# Patient Record
Sex: MALE | Race: BLACK OR AFRICAN AMERICAN | NOT HISPANIC OR LATINO | Employment: UNEMPLOYED | URBAN - METROPOLITAN AREA
[De-identification: names, ages, dates, MRNs, and addresses within clinical notes are randomized per-mention and may not be internally consistent; named-entity substitution may affect disease eponyms.]

---

## 2017-09-03 ENCOUNTER — HOSPITAL ENCOUNTER (EMERGENCY)
Facility: OTHER | Age: 27
Discharge: PSYCHIATRIC OR SUBSTANCE USE FACILITY | End: 2017-09-05
Attending: EMERGENCY MEDICINE
Payer: MEDICAID

## 2017-09-03 DIAGNOSIS — R45.89 EMOTIONAL UPSET: Primary | ICD-10-CM

## 2017-09-03 LAB
ALBUMIN SERPL BCP-MCNC: 3.7 G/DL
ALP SERPL-CCNC: 69 U/L
ALT SERPL W/O P-5'-P-CCNC: 32 U/L
AMPHET+METHAMPHET UR QL: NEGATIVE
ANION GAP SERPL CALC-SCNC: 10 MMOL/L
ANISOCYTOSIS BLD QL SMEAR: SLIGHT
APAP SERPL-MCNC: <3 UG/ML
AST SERPL-CCNC: 46 U/L
BARBITURATES UR QL SCN>200 NG/ML: NEGATIVE
BASOPHILS # BLD AUTO: 0.02 K/UL
BASOPHILS NFR BLD: 0.2 %
BENZODIAZ UR QL SCN>200 NG/ML: NEGATIVE
BILIRUB SERPL-MCNC: 0.6 MG/DL
BILIRUB UR QL STRIP: NEGATIVE
BUN SERPL-MCNC: 8 MG/DL
BZE UR QL SCN: ABNORMAL
CALCIUM SERPL-MCNC: 9.5 MG/DL
CANNABINOIDS UR QL SCN: ABNORMAL
CHLORIDE SERPL-SCNC: 98 MMOL/L
CK SERPL-CCNC: 1307 U/L
CK SERPL-CCNC: 1907 U/L
CLARITY UR: CLEAR
CO2 SERPL-SCNC: 26 MMOL/L
COLOR UR: YELLOW
CREAT SERPL-MCNC: 1 MG/DL
CREAT UR-MCNC: 408.1 MG/DL
DIFFERENTIAL METHOD: ABNORMAL
EOSINOPHIL # BLD AUTO: 0.1 K/UL
EOSINOPHIL NFR BLD: 1.2 %
ERYTHROCYTE [DISTWIDTH] IN BLOOD BY AUTOMATED COUNT: 14.4 %
EST. GFR  (AFRICAN AMERICAN): >60 ML/MIN/1.73 M^2
EST. GFR  (NON AFRICAN AMERICAN): >60 ML/MIN/1.73 M^2
ETHANOL SERPL-MCNC: <10 MG/DL
GIANT PLATELETS BLD QL SMEAR: PRESENT
GLUCOSE SERPL-MCNC: 131 MG/DL
GLUCOSE UR QL STRIP: NEGATIVE
HCT VFR BLD AUTO: 39.6 %
HGB BLD-MCNC: 12.6 G/DL
HGB UR QL STRIP: NEGATIVE
KETONES UR QL STRIP: NEGATIVE
LEUKOCYTE ESTERASE UR QL STRIP: NEGATIVE
LYMPHOCYTES # BLD AUTO: 0.9 K/UL
LYMPHOCYTES NFR BLD: 9.1 %
MCH RBC QN AUTO: 23 PG
MCHC RBC AUTO-ENTMCNC: 31.8 G/DL
MCV RBC AUTO: 72 FL
METHADONE UR QL SCN>300 NG/ML: NEGATIVE
MONOCYTES # BLD AUTO: 0.8 K/UL
MONOCYTES NFR BLD: 8.5 %
NEUTROPHILS # BLD AUTO: 7.7 K/UL
NEUTROPHILS NFR BLD: 81 %
NITRITE UR QL STRIP: NEGATIVE
OPIATES UR QL SCN: ABNORMAL
OVALOCYTES BLD QL SMEAR: ABNORMAL
PCP UR QL SCN>25 NG/ML: NEGATIVE
PH UR STRIP: 6 [PH] (ref 5–8)
PLATELET # BLD AUTO: 258 K/UL
PLATELET BLD QL SMEAR: ABNORMAL
PMV BLD AUTO: 9.5 FL
POIKILOCYTOSIS BLD QL SMEAR: SLIGHT
POTASSIUM SERPL-SCNC: 3.2 MMOL/L
PROT SERPL-MCNC: 7.5 G/DL
PROT UR QL STRIP: NEGATIVE
RBC # BLD AUTO: 5.47 M/UL
SCHISTOCYTES BLD QL SMEAR: PRESENT
SODIUM SERPL-SCNC: 134 MMOL/L
SP GR UR STRIP: 1.02 (ref 1–1.03)
SPHEROCYTES BLD QL SMEAR: ABNORMAL
T4 FREE SERPL-MCNC: 0.88 NG/DL
TOXICOLOGY INFORMATION: ABNORMAL
TSH SERPL DL<=0.005 MIU/L-ACNC: 0.14 UIU/ML
URN SPEC COLLECT METH UR: ABNORMAL
UROBILINOGEN UR STRIP-ACNC: ABNORMAL EU/DL
WBC # BLD AUTO: 9.53 K/UL

## 2017-09-03 PROCEDURE — 80320 DRUG SCREEN QUANTALCOHOLS: CPT

## 2017-09-03 PROCEDURE — 25000003 PHARM REV CODE 250: Performed by: EMERGENCY MEDICINE

## 2017-09-03 PROCEDURE — 93005 ELECTROCARDIOGRAM TRACING: CPT

## 2017-09-03 PROCEDURE — 93010 ELECTROCARDIOGRAM REPORT: CPT | Mod: ,,, | Performed by: INTERNAL MEDICINE

## 2017-09-03 PROCEDURE — 82550 ASSAY OF CK (CPK): CPT

## 2017-09-03 PROCEDURE — 85025 COMPLETE CBC W/AUTO DIFF WBC: CPT

## 2017-09-03 PROCEDURE — 25000003 PHARM REV CODE 250: Performed by: PSYCHIATRY & NEUROLOGY

## 2017-09-03 PROCEDURE — 84443 ASSAY THYROID STIM HORMONE: CPT

## 2017-09-03 PROCEDURE — 80329 ANALGESICS NON-OPIOID 1 OR 2: CPT

## 2017-09-03 PROCEDURE — 84439 ASSAY OF FREE THYROXINE: CPT

## 2017-09-03 PROCEDURE — 82550 ASSAY OF CK (CPK): CPT | Mod: 91

## 2017-09-03 PROCEDURE — 81003 URINALYSIS AUTO W/O SCOPE: CPT

## 2017-09-03 PROCEDURE — 80053 COMPREHEN METABOLIC PANEL: CPT

## 2017-09-03 PROCEDURE — 96360 HYDRATION IV INFUSION INIT: CPT

## 2017-09-03 PROCEDURE — 80307 DRUG TEST PRSMV CHEM ANLYZR: CPT

## 2017-09-03 PROCEDURE — 99285 EMERGENCY DEPT VISIT HI MDM: CPT | Mod: 25

## 2017-09-03 PROCEDURE — 96361 HYDRATE IV INFUSION ADD-ON: CPT

## 2017-09-03 RX ORDER — ACETAMINOPHEN 325 MG/1
650 TABLET ORAL EVERY 6 HOURS PRN
Status: DISCONTINUED | OUTPATIENT
Start: 2017-09-03 | End: 2017-09-05 | Stop reason: HOSPADM

## 2017-09-03 RX ORDER — POTASSIUM CHLORIDE 20 MEQ/1
60 TABLET, EXTENDED RELEASE ORAL
Status: COMPLETED | OUTPATIENT
Start: 2017-09-03 | End: 2017-09-03

## 2017-09-03 RX ORDER — DIVALPROEX SODIUM 250 MG/1
250 TABLET, DELAYED RELEASE ORAL 3 TIMES DAILY
Status: DISCONTINUED | OUTPATIENT
Start: 2017-09-03 | End: 2017-09-04

## 2017-09-03 RX ORDER — DIAZEPAM 5 MG/1
10 TABLET ORAL
Status: COMPLETED | OUTPATIENT
Start: 2017-09-03 | End: 2017-09-03

## 2017-09-03 RX ORDER — OLANZAPINE 10 MG/1
10 TABLET, ORALLY DISINTEGRATING ORAL EVERY 8 HOURS PRN
Status: DISCONTINUED | OUTPATIENT
Start: 2017-09-03 | End: 2017-09-05 | Stop reason: HOSPADM

## 2017-09-03 RX ORDER — QUETIAPINE FUMARATE 100 MG/1
200 TABLET, FILM COATED ORAL NIGHTLY
Status: DISCONTINUED | OUTPATIENT
Start: 2017-09-03 | End: 2017-09-05 | Stop reason: HOSPADM

## 2017-09-03 RX ORDER — SODIUM CHLORIDE 9 MG/ML
1000 INJECTION, SOLUTION INTRAVENOUS
Status: COMPLETED | OUTPATIENT
Start: 2017-09-03 | End: 2017-09-03

## 2017-09-03 RX ADMIN — ACETAMINOPHEN 650 MG: 325 TABLET ORAL at 07:09

## 2017-09-03 RX ADMIN — POTASSIUM CHLORIDE 60 MEQ: 1500 TABLET, EXTENDED RELEASE ORAL at 07:09

## 2017-09-03 RX ADMIN — SODIUM CHLORIDE 1000 ML: 0.9 INJECTION, SOLUTION INTRAVENOUS at 08:09

## 2017-09-03 RX ADMIN — DIAZEPAM 10 MG: 5 TABLET ORAL at 07:09

## 2017-09-03 RX ADMIN — DIVALPROEX SODIUM 250 MG: 250 TABLET, DELAYED RELEASE ORAL at 12:09

## 2017-09-03 NOTE — ED NOTES
Pt is sitting on edge of stretcher laying against head of bed, sleeping, with blanket for comfort. Pt remains in paper scrubs and free of personal items. Pt is connected to cardiac monitoring, NIBP cuff and pulse ox. Appears to be in no acute distress. Ryan Thompson, at bedside. Will continue to monitor.

## 2017-09-03 NOTE — ED NOTES
Rounding completed on pt, PEC protocol continued. Raphael Thompson remains at bedside for direct observation and pt remains close to nurse's station. Pt sleeping in stretcher but arouses to verbal stimulation. NAD, even, unlabored respirations, pt remains on cardiac monitor, pulse ox and BP cycling. Bed in lowest, locked position, side rails up x 2. NS infusing.

## 2017-09-03 NOTE — ED NOTES
Rounding completed on pt, PEC protocol continued. betsey Thompson remains at bedside for direct observation and pt remains close to nurse's station. Pt sleeping in stretcher, meal tray at bedside. NAD, even, unlabored respirations, pt remains on cardiac monitor, pulse ox and BP cycling. Bed in lowest, locked position, side rails up x 2.

## 2017-09-03 NOTE — ED NOTES
Report received from Samantha JACINTO RN. PT lying in bed with eyes closed,bed locked and low, side rails up Xs 2, respirations even and unlabored, sitter Raphael posted for direct observation. Pt remains connected to cardiac monitor, automatic BP, and continuous pulse ox pending medical clearance. Will continue to monitor.

## 2017-09-03 NOTE — ED NOTES
Dr. Shepherd at bedside. Rounding completed on pt, PEC protocol continued. Soha Thompson remains at bedside for direct observation and pt remains close to nurse's station. NAD, even, unlabored respirations, pt remains on cardiac monitor, pulse ox and BP cycling. Bed in lowest, locked position, side rails up x 2. NS completed and CPK sent to lab.

## 2017-09-03 NOTE — ED NOTES
Assumed care of pt from LEEANNE Gupta. When RN attempting to give patient PO potassium pt began grimacing and c/o bilateral leg cramps. Cramping continued for approx 4 min., MD informed and given PO valium. PEC protocol continued, Raphael nava at bedside for direct observation. Pt remains close to nurse's station, bed in lowest, locked position, side rails up x 2.

## 2017-09-03 NOTE — PROVIDER PROGRESS NOTES - EMERGENCY DEPT.
Encounter Date: 9/3/2017    ED Physician Progress Notes        Physician Note:   I was asked by Dr. Romero to follow-up on the patient's CPK after IV fluids.  Repeat CPK is 1300 which is improved from 1900.  At this point I feel only oral hydration as needed.  I will medically clear the patient for psychiatric evaluation.    6:16 PM I have been observing the patient throughout my shift of the last few hours.  His been stable.  I've been eating and going to the restroom.

## 2017-09-03 NOTE — ED NOTES
Belongings  Plastic rosary  1 pair of socks  1 pair of black shoes  1 pair of jeans  1 multi color shirt  2 plastic bracelets.

## 2017-09-03 NOTE — ED NOTES
Rounding completed on pt, PEC protocol continued. Raphael Thompson remains at bedside for direct observation and pt remains close to nurse's station. NAD, even, unlabored respirations, pt remains on cardiac monitor, pulse ox and BP cycling. Bed in lowest, locked position, side rails up x 2. NS infusing. Will collect CPK after fluids are completed.

## 2017-09-03 NOTE — ED NOTES
PEC packet faxed to: [facility], [facility], [facility], [facility], [facility], [facility], [facility], [facility], [facility], [facility]

## 2017-09-03 NOTE — ED NOTES
Called dietary for PEC meal tray. Pt updated on plan of care. Pt requesting to call mother. Dr. Latif made aware and approved. PEC protocol continued. Soha Thompson remains at bedside for direct observation and pt remains close to nurse's station. Pt sleeping in stretcher. NAD, even, unlabored respirations. Bed in lowest, locked position, side rails up x 2.

## 2017-09-03 NOTE — ED NOTES
Pt care assumed.  Pt rounding complete.  Pain 8/10, c/o back pain.  Restroom and comfort needs addressed.  Pt updated on plan of care.  Will continue to monitor.  Deja Thompson, at bedside.

## 2017-09-03 NOTE — ED NOTES
Rounding completed on pt, PEC protocol continued. betsey Thompson remains at bedside for direct observation and pt remains close to nurse's station. Pt sleeping in stretcher. NAD, even, unlabored respirations, pt remains on cardiac monitor, pulse ox and BP cycling. Bed in lowest, locked position, side rails up x 2.

## 2017-09-03 NOTE — ED NOTES
Faxed packet to [facility], [facility], [facility], [facility], [facility], [facility], [facility], [facility], [facility], [facility]

## 2017-09-03 NOTE — ED NOTES
Pt is sitting on edge of bed. Pt in paper scrubs and free of personal items. Pt appears to be in no acute distress but remains restless. Pt is cooperative with staff and non-combative. Ryan Thompson, at bedside. Bed is locked and in lowest position. Will continue to monitor.

## 2017-09-03 NOTE — ED NOTES
PEC protocol continued. betsey Thompson remains at bedside for direct observation and pt remains close to nurse's station. Pt sleeping in stretcher. NAD, even, unlabored respirations. Bed in lowest, locked position, side rails up x 2.

## 2017-09-03 NOTE — ED NOTES
Dr Shepherd states no beds available at [facility], and to seek placement through [facility].

## 2017-09-03 NOTE — ED NOTES
Faxed packet to [facility], [facility], [facility], [facility], [facility], [facility], [facility], [facility], [facility], [facility], [facility], [facility], [facility]

## 2017-09-03 NOTE — ED TRIAGE NOTES
"Pt presents to ED with c/o suicide attempt PTA by "injecting opiate pills, heroin and coke" after an argument with his wife who has been "being a thot". Pt reports previous hx of suicidal ideation and has hx of depression and bipolar. Pt is not compliant with phychiatric medication.       "

## 2017-09-03 NOTE — ED NOTES
Rounding completed on pt, PEC protocol continued. Raphael Thompson remains at bedside for direct observation and pt remains close to nurse's station. NAD, even, unlabored respirations, pt remains on cardiac monitor, pulse ox and BP cycling. Bed in lowest, locked position, side rails up x 2.

## 2017-09-03 NOTE — ED NOTES
PEC protocol continued. Soha Thompson remains at bedside for direct observation and pt remains close to nurse's station. Pt sleeping in stretcher. NAD, even, unlabored respirations. Bed in lowest, locked position, side rails up x 2.

## 2017-09-04 VITALS
TEMPERATURE: 98 F | HEIGHT: 69 IN | HEART RATE: 68 BPM | BODY MASS INDEX: 28.14 KG/M2 | RESPIRATION RATE: 14 BRPM | WEIGHT: 190 LBS | SYSTOLIC BLOOD PRESSURE: 124 MMHG | DIASTOLIC BLOOD PRESSURE: 68 MMHG | OXYGEN SATURATION: 99 %

## 2017-09-04 LAB — CK SERPL-CCNC: 769 U/L

## 2017-09-04 PROCEDURE — 25000003 PHARM REV CODE 250: Performed by: PSYCHIATRY & NEUROLOGY

## 2017-09-04 PROCEDURE — 25000003 PHARM REV CODE 250: Performed by: EMERGENCY MEDICINE

## 2017-09-04 PROCEDURE — 82550 ASSAY OF CK (CPK): CPT

## 2017-09-04 RX ORDER — BUPRENORPHINE 2 MG/1
4 TABLET SUBLINGUAL 2 TIMES DAILY
Status: DISCONTINUED | OUTPATIENT
Start: 2017-09-04 | End: 2017-09-05 | Stop reason: HOSPADM

## 2017-09-04 RX ORDER — DIVALPROEX SODIUM 250 MG/1
500 TABLET, DELAYED RELEASE ORAL EVERY 8 HOURS
Status: DISCONTINUED | OUTPATIENT
Start: 2017-09-04 | End: 2017-09-05 | Stop reason: HOSPADM

## 2017-09-04 RX ADMIN — QUETIAPINE FUMARATE 200 MG: 100 TABLET, FILM COATED ORAL at 12:09

## 2017-09-04 RX ADMIN — DIVALPROEX SODIUM 250 MG: 250 TABLET, DELAYED RELEASE ORAL at 12:09

## 2017-09-04 RX ADMIN — DIVALPROEX SODIUM 250 MG: 250 TABLET, DELAYED RELEASE ORAL at 09:09

## 2017-09-04 RX ADMIN — DIVALPROEX SODIUM 500 MG: 250 TABLET, DELAYED RELEASE ORAL at 09:09

## 2017-09-04 RX ADMIN — DIVALPROEX SODIUM 500 MG: 250 TABLET, DELAYED RELEASE ORAL at 02:09

## 2017-09-04 RX ADMIN — QUETIAPINE FUMARATE 200 MG: 100 TABLET, FILM COATED ORAL at 09:09

## 2017-09-04 RX ADMIN — OLANZAPINE 10 MG: 10 TABLET, ORALLY DISINTEGRATING ORAL at 10:09

## 2017-09-04 RX ADMIN — BUPRENORPHINE HYDROCHLORIDE SUBLINGUAL 4 MG: 2 TABLET SUBLINGUAL at 09:09

## 2017-09-04 RX ADMIN — BUPRENORPHINE HYDROCHLORIDE SUBLINGUAL 4 MG: 2 TABLET SUBLINGUAL at 12:09

## 2017-09-04 NOTE — ED NOTES
Unable to Contact  [facility]cob [facility]  states she will attempt to contact him, and have him call ED for placement.

## 2017-09-04 NOTE — ED NOTES
"[facility] contacted the following facilities' admissions departments regarding placement:  [facility]- called twice; no answer- "Mailbox is full."  [facility]- no answer; will attempt call back in 30 minutes  [facility]- spoke with Purvi, who stated that they are full.  [facility]- spoke with Purvi, who stated that they are full.  [facility]- No answer; left voicemail  [facility]- pt does not meet age criteria (facility accepts pts. 25 and up).  [facility]- spoke with Colin, who stated that they are full.  [facility]- spoke with Shagufta, who stated that they have no available beds.  [facility]- no answer; left message  [facility] asked for packet to be refaxed; packet was refaxed.  [facility]- spoke with Sudhakar, who stated that they are full.  [facility]- spoke with Pippa, who stated that they will call us back.  [facility]- spoke with Darrel, who stated that their intake staff person is off-site and they have no way to confirm if packet was received, and that they have no contact information for intake staff. Packet was refaxed, and [facility] contact number was given.  [facility]- spoke with House Supervisor Irene, who stated that they are full.  [facility]- spoke with Veronica- stated that they received packet and are reviewing all packets at this time.  [facility]- spoke with Jose Alejandro who stated that they have no beds available at this time  [facility]- spoke with Patito, who stated that they have no beds.  [facility]- spoke with Xavier, who stated that they have no beds available at this time.  [facility]- spoke with Mehnaz, who stated that they have received the packet and are reviewing all packets at this time.  [facility]- pt does not meet age criteria for this facility (they accept pts. Age 30 and up).  [facility]- spoke with Iris, who stated that they have received packet and are reviewing all packets at this " time.  [facility]- spoke with Carlita, who stated that they have received packet and are reviewing all packets at this time.  [facility]- spoke with Denise who requested packet be refaxed; packet was refaxed.  [facility]- spoke with Len, who stated that they have no available beds.  [facility]- spoke with Sasha, who stated that they have received packet and are reviewing all packets at this time.  [facility]- spoke with Iris, who stated that they have received packet and are reviewing all packets at this time.  [facility]- spoke with James, who stated that they have received packet and are reviewing all packets at this time.   [facility]- spoke with Donnie, who stated that they have received packet and are reviewing all packets at this time.  [facility]- admissions stated that they have no available beds.  [facility]- spoke with Irene, who stated that they have received packet and are reviewing all packets at this time.  [facility]- no answer; will call back in 30 minutes.

## 2017-09-04 NOTE — ED NOTES
Assumed care of pt from LEEANNE Lomeli. PEC protocol continued. Linda Thompson at bedside for direct observation. Pt calm and cooperative. Pt sleeping but awakes to verbal stimulation when tech requests to take VS. Bed in lowest, locked position, side rails up x 2. Will continue to monitor for changes.

## 2017-09-04 NOTE — ED NOTES
Spoke with Denise in [facility]  Intake, who stated that pt's CPK was 1307 at 1143, and that pt cannot be eligible for acceptance until CPK is under 200.

## 2017-09-04 NOTE — ED NOTES
Pt rounding complete.  Pt asleep, respirations even and unlabored, in no acute distress.  ED sitRyan chavis, at bedside.  Will continue to monitor.

## 2017-09-04 NOTE — ED NOTES
Dr Shepherd states pt will be accepted to [facility] pending discharge.  will call in 2-3 hours with room assingment.

## 2017-09-04 NOTE — ED NOTES
Pt remains in paper scrubs. Linda Thompson, at bedside maintaining 1:1 direct visual contact and charting q15 minute patient check. Pt sleeping in bed with eyes closed. Respirations even and unlabored. Pt arouses to voice stimulus. Bed in low locked position. Side rails up x 2. Will continue to monitor.

## 2017-09-04 NOTE — ED NOTES
Dr Shepherd states no rooms available at this time, but does anticipate discharges today, and will call if a bed becomes available.

## 2017-09-04 NOTE — ED NOTES
Pt remains in paper scrubs. Tasha Thompson, at bedside maintaining 1:1 direct visual contact and charting q15 minute patient check. Pt sleeping in bed with eyes closed. Respirations even and unlabored. Pt arouses to voice stimulus, but is  lethargic. Bed in low locked position. Side rails up x 2. Will continue to monitor.

## 2017-09-04 NOTE — ED NOTES
"Pt reports increasing agitation at this time. Noncombative. Dr. Latif aware. Awaiting Dr. Holm. No placement for pt yet. RAFAEL working on finding placement for pt. Pt requesting to "sign out." Pt aware of plan of care and PEC status. NAD. Zyprexa PO given. Bed linens changed and pt resting comfortably on stretcher. Pt in soiled paper scrubs. Given new clean paper scrubs to change into. Linda Thompson, at bedside maintaining 1:1 direct visual contact and charting q15 minute patient check. Respirations even and unlabored. Bed in low locked position. Side rails up x 1. Will continue to monitor.   "

## 2017-09-04 NOTE — ED NOTES
PEC protocol continued. Linda Thompson at bedside for direct observation. Pt sleeping in stretcher in NAD with even, unlabored respirations. Bed in lowest, locked position.

## 2017-09-04 NOTE — PROVIDER PROGRESS NOTES - EMERGENCY DEPT.
Encounter Date: 9/3/2017    ED Physician Progress Notes        Physician Note:   I took over care of the patient.  Patient currently stable vital signs reviewed and are normal.  No current issues.

## 2017-09-04 NOTE — ED NOTES
Pt remains in paper scrubs. Tasha Thompson, at bedside maintaining 1:1 direct visual contact and charting q15 minute patient check. Pt sleeping in bed with eyes closed. Respirations even and unlabored. Pt arouses to voice stimulus, but is  lethargic. Bed in low locked position. Side rails up x 1. Will continue to monitor.

## 2017-09-04 NOTE — ED NOTES
PEC protocol continued. Ryan Thompson remains at bedside to record patient activities at least every 15 minutes, while maintaining direct visual contact with patient. Pt remains close to nurse's station. Pt sleeping in stretcher. NAD, even, unlabored respirations. Bed in lowest, locked position, side rails up x 2.

## 2017-09-04 NOTE — ED NOTES
Pt remains in paper scrubs. Dr. Holm at bedside. Linda Thompson at bedside maintaining 1:1 direct visual contact and charting q15 minute patient check. Pt awake and speaking to MD. Respirations even and unlabored. Pt calm and cooperative. Bed in low locked position. Side rails up x 2. Will continue to monitor.

## 2017-09-04 NOTE — ED NOTES
Pt remains in paper scrubs. Tasha Thompson at bedside maintaining 1:1 direct visual contact and charting q15 minute patient check. Pt sleeping in bed with eyes closed. Respirations even and unlabored. Pt arouses to voice stimulus, but is  lethargic. Bed in low locked position. Side rails up x 2. Will continue to monitor.

## 2017-09-04 NOTE — ED NOTES
Pt remains in paper scrubs. Linda Thompson, at bedside maintaining 1:1 direct visual contact and charting q15 minute patient check. Pt sleeping in bed with eyes closed. Respirations even and unlabored. Pt arouses to light tap on shoulder and nurse voice. Bed in low locked position. Side rails up x 1. NAD. Denies SI/HI. Awaiting Dr. Holm arrival to ER to assess pt. Will continue to monitor.

## 2017-09-04 NOTE — ED NOTES
Pt remains in paper scrubs. Chyna Thompson at bedside maintaining 1:1 direct visual contact and charting q15 minute patient check. Pt sleeping in bed with eyes closed. Respirations even and unlabored. Pt arouses to voice stimulus, but is  lethargic. Bed in low locked position. Side rails up x 2. Will continue to monitor.

## 2017-09-04 NOTE — ED NOTES
Rounding completed on pt, PEC protocol continued. Linda Thompson at bedside for direct obs. Ordered pt meal tray per request, urinal remains at bedside. Pt remains calm and cooperative. Bed in lowest, locked position, side rails up x 2.

## 2017-09-05 NOTE — ED NOTES
Upon entering room, pt asleep, respirations even and unlabored, NAD, calm, cooperative.  Pt awakened upon verbal stimulus.  ED sitter Raphael at bedside.  Restroom and comfort needs addressed.  Will continue to monitor.  Pt updated on POC.

## 2017-09-05 NOTE — PSYCH
Chart reviewed, the patient examined and case discussed with the staff.  Staff   has reported that the patient is not able to get any placement.  States he took   his medicines.  He was agitated and required p.r.n. Zyprexa.  The patient  working   on medications and interacting in by and he cannot go back to home and he has   go to his friend's place.    He admitted that he   was shooting heroin couple of times and he feels like going through withdrawals.    His body is hurting.  He is  Complaining  feels hot and colds and other withdrawal from opioid and cocaine.  He admitted that he   was snorting some cocaine and he will not find anything.  The patient's urine   was positive for cocaine, opioid and marijuana.  The patient states that he was   hospitalized at [facility] and other places, he does not remember those   places.  The patient states that he is on probation and has court date.  The   patient has no tremors or sweating at this time.  His mood is frustrated.  affect.  He states that he feels angry and like crap.      His blood pressure is 117/59 with 60 pulse.  He believes that Seroquel helped   the last night, he took his Seroquel.  His CPK is 700+.  His CBC and chemistry   are normal.  His liver enzymes are normal.  The patient states that he is   unmarried and has 1 child.  He was born in [place]   and describes childhood as not good.  He has no thoughts of harm to self or others.  He seems to have   cravings for drugs.     ASSESSMENT AND PLAN:  1.  Initiate  Subutex 4 mg sublingual twice a day.  2.  We will increase Depakote to 500 mg 3 times a day.  3.  We will continue Seroquel 200 mg at nighttime.  4.  We will transfer this patient to any available psych facility.      GEETHA/IN  dd: 09/04/2017 12:14:53 (CDT)  td: 09/04/2017 13:24:56 (CDT)  Doc ID   #[id number]  Job ID #[id number]    CC:

## 2017-09-05 NOTE — PSYCH
"IDENTIFICATION DATA:  This is a 26-year-old single  male who was   brought to ER by family due to drug problem, suicidal thoughts, depression and   was noncompliant with medications.  This consult is requested by Dr. Latif   for psych evaluation.  The patient is on a PEC status.    CHIEF COMPLAINT:  "My mother brought me here to get help."    HISTORY OF PRESENT ILLNESS:  According to ER notes, the patient injected opioid   pills, unknown amount after argument with significant other versus wife.  The   patient was recently released from care home.  The patient states that he is   depressed, sad, helpless, irritable, angry and getting frequent suicidal   thoughts.  The patient states that he hears voices, but did not elaborate.  The   patient is sleepy at this time and requires a lot of effort to answer questions.    The patient states that he snorted some cocaine and he is using it daily. He   was positive for opioids, marijuana and cocaine.  He is sleepy at this time,   answers most of the questions.  His alcohol level was less than 10.  The patient   admitted that he is depressed.  He has a history of self-inflicted behavior and   used to pick lot of skin.    He admitted that   he is off of his medicine for about 2 weeks.  The patient's chart indicates that   he has  history of heroin and he was using it on a daily basis.  He has a   history of cocaine and heroin and has very limited sobriety and clean time.  The   patient admitted that he used drugs yesterday.  The patient has a history of   marijuana and he used to smoke marijuana daily.  His chart indicates that he was   on Seroquel, Abilify, Depakote and Prozac.  He did not take any medicine after   discharge from the hospital.  He tried to kill himself many times in the past by   hanging.     PAST PSYCHIATRIC HISTORY:  The patient has a history of suicidal thoughts and   attempts. The patient stated   that he had been to psych facilities, but " he is not sure about the names of   those medicines.  Chart indicates that he was on Abilify, Prozac, Depakote in   the past.  He had been to hospitals  in the past.  He has a history of suicide   attempts.  He is noncompliant with medications and aftercare plan.  He does not   recall the name of his hospital, psychiatrist and rehabilitation facility.    SOCIAL AND FAMILY HISTORY:  Unobtainable.  According to record, he was born and   raised in [place].  He described his childhood as not good.  He is single   and has no children.  He has worked at a [employer].  His wife was cheating on   him.  His father had some mental illness.  I tried to reach the patient's   significant other and no response was received.    PAST MEDICAL HISTORY:  WBC is 9.5, hemoglobin 12.6, hematocrit 39, MCV 72.    Glucose 131.  Sodium 134 and potassium 3.3.  His bilirubin is 0.6 at   nighttime.  His urine is positive for cocaine, opioids, marijuana.    MENTAL STATUS EXAMINATION:  This is a 26-year-old healthy-looking    American male who is sleepy at this time, made fair eye contact.  He was   oriented to day, date, month and year.  Mood is devoid of emotion with a flat   affect.  He is not interested in conversation.  His speech was fluent and clear   and goal oriented.  No pressured speech, loose association or flight of ideas   noted.  He is oriented to day, date, month and year.    The   patient states that he hears voices, but did not elaborate.  According to   records, he tried to kill himself by injecting more  pain pills.  He has   marks on his veins.  Insight and judgment are impaired.  He is of average   intelligence person.    PSYCHIATRIC DIAGNOSES:  AXIS I:  Bipolar disorder, depressed without psychotic features, opioid abuse   disorder, moderate; cocaine use disorder, moderate without perceptual   disturbance, cannabis use disorder, moderate.  AXIS II:  History of antisocial personality disorder.  AXIS III:  Asthma,  hypothyroidism, anemia.  AXIS IV:  Medical problems, financial difficulties, conflict with significant   other versus wife, noncompliant with aftercare plan, drug problem.  AXIS V:  35.    RECOMMENDATIONS:  We will transfer this patient to any available psych facility.    We will start this patient on Depakote 250 three times a day.  We will   consider the option of Seroquel versus Zoloft after getting more information   from family of the patient when he is more alert.  I tried to reach significant   other at [phone].    PROGNOSIS:  Fair.    ESTIMATED LENGTH OF STAY IN THE HOSPITAL:  Would be 3 to 4 days.    ASSETS:  The patient is verbal, medically stable and has a place to live.    PROBLEM LIST:  Suicidal ideation and drug problem, noncompliant with   medications.      AI/HN  dd: 09/03/2017 12:14:37 (CDT)  td: 09/03/2017 20:03:05 (CDT)  Doc ID   #[id number]  Job ID #[id number]    CC:

## 2017-09-05 NOTE — ED NOTES
Pt remains in paper scrubs. Mica Thompson bedside maintaining 1:1 direct visual contact and charting q15 minute patient check. Pt awake and watching tv. Respirations even and unlabored. Bed in low locked position. Side rails up x 1. Will continue to monitor.

## 2017-09-05 NOTE — ED NOTES
Pt remains in paper scrubs. Raphael Thompson at bedside maintaining 1:1 direct visual contact and charting q15 minute patient check. Pt sleeping in bed with eyes closed. Respirations even and unlabored. Pt arouses to voice stimulus, but is  lethargic. Bed in low locked position. Side rails up x 2. Will continue to monitor.

## 2017-09-05 NOTE — ED NOTES
Pt rounding complete.  Pt resting on stretcher with eyes closed, respirations even and unlabored, in no acute distress.  ED sitter Raphael at bedside.  Will continue to monitor.

## 2017-09-05 NOTE — ED NOTES
Called Jaycob at [facility] concerning bed placement for pt.  Jaycob states he will assess the situation and call back.

## 2017-09-05 NOTE — ED NOTES
Pt remains in paper scrubs. Raphael Thompson at bedside maintaining 1:1 direct visual contact and charting q15 minute patient check. Pt sitting in bed watching tv, fully awake alert and oriented. Respirations even and unlabored. Pt ate dinner. Bed in low locked position. Side rails up x 1. Will continue to monitor.

## 2017-09-05 NOTE — ED NOTES
Spoke to Jaycob at [facility]. States will accepted to the 5th floor. Can call report in 10 minutes: [phone] ext [phone].

## 2017-09-05 NOTE — ED NOTES
Report received by Neida. Pt visualized, sleeping upon entry, aroused with voice stimuli. Urinal at bedside, comfort needs addressed. Pt calm and cooperative. Pt updated on POC. Donna Lim at bedside for 1:1 direct visual contact.

## 2017-09-25 ENCOUNTER — HOSPITAL ENCOUNTER (EMERGENCY)
Facility: OTHER | Age: 27
Discharge: PSYCHIATRIC OR SUBSTANCE USE FACILITY | End: 2017-09-25
Attending: EMERGENCY MEDICINE
Payer: MEDICAID

## 2017-09-25 VITALS
HEART RATE: 61 BPM | BODY MASS INDEX: 25.2 KG/M2 | SYSTOLIC BLOOD PRESSURE: 135 MMHG | HEIGHT: 71 IN | WEIGHT: 180 LBS | RESPIRATION RATE: 18 BRPM | OXYGEN SATURATION: 98 % | DIASTOLIC BLOOD PRESSURE: 74 MMHG | TEMPERATURE: 98 F

## 2017-09-25 DIAGNOSIS — R79.89 LOW TSH LEVEL: ICD-10-CM

## 2017-09-25 DIAGNOSIS — D64.9 ANEMIA, UNSPECIFIED TYPE: ICD-10-CM

## 2017-09-25 DIAGNOSIS — R45.851 SUICIDAL IDEATIONS: Primary | ICD-10-CM

## 2017-09-25 LAB
ALBUMIN SERPL BCP-MCNC: 2.9 G/DL
ALP SERPL-CCNC: 62 U/L
ALT SERPL W/O P-5'-P-CCNC: 28 U/L
AMPHET+METHAMPHET UR QL: NEGATIVE
ANION GAP SERPL CALC-SCNC: 8 MMOL/L
ANISOCYTOSIS BLD QL SMEAR: SLIGHT
APAP SERPL-MCNC: <3 UG/ML
AST SERPL-CCNC: 29 U/L
BARBITURATES UR QL SCN>200 NG/ML: NEGATIVE
BASOPHILS # BLD AUTO: 0.02 K/UL
BASOPHILS NFR BLD: 0.3 %
BENZODIAZ UR QL SCN>200 NG/ML: NEGATIVE
BILIRUB SERPL-MCNC: 0.2 MG/DL
BILIRUB UR QL STRIP: NEGATIVE
BUN SERPL-MCNC: 14 MG/DL
BZE UR QL SCN: NORMAL
CALCIUM SERPL-MCNC: 9 MG/DL
CANNABINOIDS UR QL SCN: NORMAL
CHLORIDE SERPL-SCNC: 106 MMOL/L
CLARITY UR: CLEAR
CO2 SERPL-SCNC: 26 MMOL/L
COLOR UR: YELLOW
CREAT SERPL-MCNC: 1 MG/DL
CREAT UR-MCNC: 218.4 MG/DL
DACRYOCYTES BLD QL SMEAR: ABNORMAL
DIFFERENTIAL METHOD: ABNORMAL
EOSINOPHIL # BLD AUTO: 0.3 K/UL
EOSINOPHIL NFR BLD: 4.8 %
ERYTHROCYTE [DISTWIDTH] IN BLOOD BY AUTOMATED COUNT: 15.1 %
EST. GFR  (AFRICAN AMERICAN): >60 ML/MIN/1.73 M^2
EST. GFR  (NON AFRICAN AMERICAN): >60 ML/MIN/1.73 M^2
ETHANOL SERPL-MCNC: <10 MG/DL
GIANT PLATELETS BLD QL SMEAR: PRESENT
GLUCOSE SERPL-MCNC: 82 MG/DL
GLUCOSE UR QL STRIP: NEGATIVE
HCT VFR BLD AUTO: 36 %
HGB BLD-MCNC: 11.1 G/DL
HGB UR QL STRIP: NEGATIVE
HYPOCHROMIA BLD QL SMEAR: ABNORMAL
KETONES UR QL STRIP: NEGATIVE
LEUKOCYTE ESTERASE UR QL STRIP: NEGATIVE
LYMPHOCYTES # BLD AUTO: 1.6 K/UL
LYMPHOCYTES NFR BLD: 23.1 %
MCH RBC QN AUTO: 22.4 PG
MCHC RBC AUTO-ENTMCNC: 30.8 G/DL
MCV RBC AUTO: 73 FL
METHADONE UR QL SCN>300 NG/ML: NEGATIVE
MONOCYTES # BLD AUTO: 0.6 K/UL
MONOCYTES NFR BLD: 7.8 %
NEUTROPHILS # BLD AUTO: 4.5 K/UL
NEUTROPHILS NFR BLD: 64 %
NITRITE UR QL STRIP: NEGATIVE
OPIATES UR QL SCN: NORMAL
OVALOCYTES BLD QL SMEAR: ABNORMAL
PCP UR QL SCN>25 NG/ML: NEGATIVE
PH UR STRIP: 7 [PH] (ref 5–8)
PLATELET # BLD AUTO: 269 K/UL
PLATELET BLD QL SMEAR: ABNORMAL
PMV BLD AUTO: 9.6 FL
POIKILOCYTOSIS BLD QL SMEAR: SLIGHT
POTASSIUM SERPL-SCNC: 3.8 MMOL/L
PROT SERPL-MCNC: 7.1 G/DL
PROT UR QL STRIP: NEGATIVE
RBC # BLD AUTO: 4.96 M/UL
SCHISTOCYTES BLD QL SMEAR: ABNORMAL
SODIUM SERPL-SCNC: 140 MMOL/L
SP GR UR STRIP: 1.02 (ref 1–1.03)
T4 FREE SERPL-MCNC: 0.72 NG/DL
TOXICOLOGY INFORMATION: NORMAL
TSH SERPL DL<=0.005 MIU/L-ACNC: 0.15 UIU/ML
URN SPEC COLLECT METH UR: NORMAL
UROBILINOGEN UR STRIP-ACNC: 1 EU/DL
WBC # BLD AUTO: 7.09 K/UL

## 2017-09-25 PROCEDURE — 80307 DRUG TEST PRSMV CHEM ANLYZR: CPT

## 2017-09-25 PROCEDURE — 80053 COMPREHEN METABOLIC PANEL: CPT

## 2017-09-25 PROCEDURE — 99285 EMERGENCY DEPT VISIT HI MDM: CPT | Mod: 25

## 2017-09-25 PROCEDURE — 84443 ASSAY THYROID STIM HORMONE: CPT

## 2017-09-25 PROCEDURE — 93010 ELECTROCARDIOGRAM REPORT: CPT | Mod: ,,, | Performed by: INTERNAL MEDICINE

## 2017-09-25 PROCEDURE — 84439 ASSAY OF FREE THYROXINE: CPT

## 2017-09-25 PROCEDURE — 80329 ANALGESICS NON-OPIOID 1 OR 2: CPT

## 2017-09-25 PROCEDURE — 80320 DRUG SCREEN QUANTALCOHOLS: CPT

## 2017-09-25 PROCEDURE — 85025 COMPLETE CBC W/AUTO DIFF WBC: CPT

## 2017-09-25 PROCEDURE — 81003 URINALYSIS AUTO W/O SCOPE: CPT

## 2017-09-25 PROCEDURE — 93005 ELECTROCARDIOGRAM TRACING: CPT

## 2017-09-25 RX ORDER — CLONIDINE HYDROCHLORIDE 0.1 MG/1
0.1 TABLET ORAL 3 TIMES DAILY
COMMUNITY

## 2017-09-25 RX ORDER — QUETIAPINE FUMARATE 300 MG/1
300 TABLET, FILM COATED ORAL NIGHTLY
COMMUNITY

## 2017-09-25 RX ORDER — GABAPENTIN 600 MG/1
600 TABLET ORAL 3 TIMES DAILY
COMMUNITY

## 2017-09-25 RX ORDER — QUETIAPINE FUMARATE 50 MG/1
50 TABLET, FILM COATED ORAL DAILY
COMMUNITY

## 2017-09-25 RX ORDER — BUPRENORPHINE HYDROCHLORIDE AND NALOXONE HYDROCHLORIDE DIHYDRATE 2; .5 MG/1; MG/1
1 TABLET SUBLINGUAL 2 TIMES DAILY PRN
COMMUNITY

## 2017-09-25 RX ORDER — DIVALPROEX SODIUM 250 MG/1
250 TABLET, DELAYED RELEASE ORAL EVERY MORNING
COMMUNITY

## 2017-09-25 RX ORDER — CITALOPRAM 40 MG/1
40 TABLET, FILM COATED ORAL DAILY
COMMUNITY

## 2017-09-25 NOTE — ED NOTES
Report received from LEEANNE Hoyos. Pt resting comfortably on stretcher, respirations even and unlabored, pt in no acute distress. Instructed to called report in 15 minutes. ED sitpartha Hutton at bedside. Will continue to monitor.
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
## 2017-09-25 NOTE — ED NOTES
Pt says he was recently released from [facility] and they changed his medications, now he is having auditory hallucinations and SI.     LOC: The patient is awake, alert and aware of environment with an appropriate affect, the patient is oriented x 3 and speaking appropriately.  APPEARANCE: Patient resting comfortably and in no acute distress, patient is clean and well groomed, patient's clothing is properly fastened.  SKIN: The skin is warm and dry, patient has normal skin turgor and moist mucus membranes, skin intact, no breakdown or brusing noted.  MUSKULOSKELETAL: Patient moving all extremities well, no obvious swelling or deformities noted.  RESPIRATORY: Airway is open and patent, respirations are spontaneous, patient has a normal effort and rate. Breath sounds are clear and equal bilaterally.  CARDIAC: Normal heart sounds. No peripheral edema.  ABDOMEN: Soft and non tender to palpation, no distention noted.

## 2017-09-25 NOTE — ED NOTES
Pt resting in bed, NAD, respirations even and unlabored. Elnathure at bedside doing Q15 min assesments. Will continue to monitor.

## 2017-09-26 NOTE — ED NOTES
[facility] called for patient transport to [facility].  Stated will be there within the hour.

## 2017-09-26 NOTE — ED NOTES
Patient resting comfortably in stretcher.  No signs of distress noted.  Patient updated on POC and [facility].  PEC precautions maintained.  ED Anni nava, at the bedside for direct observation.  Will continue to monitor.

## 2018-01-08 ENCOUNTER — HOSPITAL ENCOUNTER (EMERGENCY)
Facility: OTHER | Age: 28
Discharge: PSYCHIATRIC OR SUBSTANCE USE FACILITY | End: 2018-01-08
Attending: EMERGENCY MEDICINE
Payer: MEDICAID

## 2018-01-08 VITALS
DIASTOLIC BLOOD PRESSURE: 98 MMHG | SYSTOLIC BLOOD PRESSURE: 140 MMHG | TEMPERATURE: 99 F | OXYGEN SATURATION: 97 % | HEART RATE: 67 BPM | RESPIRATION RATE: 16 BRPM

## 2018-01-08 DIAGNOSIS — F14.10 COCAINE ABUSE: ICD-10-CM

## 2018-01-08 DIAGNOSIS — F11.10 HEROIN ABUSE: Primary | ICD-10-CM

## 2018-01-08 DIAGNOSIS — R45.89 EMOTIONAL UPSET: ICD-10-CM

## 2018-01-08 LAB
ALBUMIN SERPL BCP-MCNC: 3.2 G/DL
ALP SERPL-CCNC: 80 U/L
ALT SERPL W/O P-5'-P-CCNC: 277 U/L
AMPHET+METHAMPHET UR QL: NEGATIVE
ANION GAP SERPL CALC-SCNC: 6 MMOL/L
APAP SERPL-MCNC: <3 UG/ML
AST SERPL-CCNC: 130 U/L
BARBITURATES UR QL SCN>200 NG/ML: NEGATIVE
BASOPHILS # BLD AUTO: 0.02 K/UL
BASOPHILS NFR BLD: 0.4 %
BENZODIAZ UR QL SCN>200 NG/ML: NORMAL
BILIRUB SERPL-MCNC: 0.3 MG/DL
BILIRUB UR QL STRIP: ABNORMAL
BUN SERPL-MCNC: 10 MG/DL
BZE UR QL SCN: NORMAL
CALCIUM SERPL-MCNC: 9.3 MG/DL
CANNABINOIDS UR QL SCN: NORMAL
CHLORIDE SERPL-SCNC: 104 MMOL/L
CLARITY UR: CLEAR
CO2 SERPL-SCNC: 28 MMOL/L
COLOR UR: YELLOW
CREAT SERPL-MCNC: 1.1 MG/DL
CREAT UR-MCNC: 364.6 MG/DL
DIFFERENTIAL METHOD: ABNORMAL
EOSINOPHIL # BLD AUTO: 0.2 K/UL
EOSINOPHIL NFR BLD: 4.1 %
ERYTHROCYTE [DISTWIDTH] IN BLOOD BY AUTOMATED COUNT: 17.1 %
EST. GFR  (AFRICAN AMERICAN): >60 ML/MIN/1.73 M^2
EST. GFR  (NON AFRICAN AMERICAN): >60 ML/MIN/1.73 M^2
ETHANOL SERPL-MCNC: <10 MG/DL
GLUCOSE SERPL-MCNC: 87 MG/DL
GLUCOSE UR QL STRIP: NEGATIVE
HCT VFR BLD AUTO: 36.7 %
HGB BLD-MCNC: 11.3 G/DL
HGB UR QL STRIP: NEGATIVE
KETONES UR QL STRIP: NEGATIVE
LEUKOCYTE ESTERASE UR QL STRIP: NEGATIVE
LYMPHOCYTES # BLD AUTO: 2.7 K/UL
LYMPHOCYTES NFR BLD: 48.2 %
MCH RBC QN AUTO: 21.5 PG
MCHC RBC AUTO-ENTMCNC: 30.8 G/DL
MCV RBC AUTO: 70 FL
METHADONE UR QL SCN>300 NG/ML: NEGATIVE
MONOCYTES # BLD AUTO: 0.5 K/UL
MONOCYTES NFR BLD: 8.8 %
NEUTROPHILS # BLD AUTO: 2.2 K/UL
NEUTROPHILS NFR BLD: 38.3 %
NITRITE UR QL STRIP: NEGATIVE
OPIATES UR QL SCN: NORMAL
PCP UR QL SCN>25 NG/ML: NEGATIVE
PH UR STRIP: 6 [PH] (ref 5–8)
PLATELET # BLD AUTO: 254 K/UL
PMV BLD AUTO: 9.2 FL
POTASSIUM SERPL-SCNC: 4.1 MMOL/L
PROT SERPL-MCNC: 7.1 G/DL
PROT UR QL STRIP: ABNORMAL
RBC # BLD AUTO: 5.26 M/UL
SODIUM SERPL-SCNC: 138 MMOL/L
SP GR UR STRIP: >=1.03 (ref 1–1.03)
T4 FREE SERPL-MCNC: 1.02 NG/DL
TOXICOLOGY INFORMATION: NORMAL
TSH SERPL DL<=0.005 MIU/L-ACNC: 0.18 UIU/ML
URN SPEC COLLECT METH UR: ABNORMAL
UROBILINOGEN UR STRIP-ACNC: 1 EU/DL
WBC # BLD AUTO: 5.6 K/UL

## 2018-01-08 PROCEDURE — 85025 COMPLETE CBC W/AUTO DIFF WBC: CPT

## 2018-01-08 PROCEDURE — 81003 URINALYSIS AUTO W/O SCOPE: CPT

## 2018-01-08 PROCEDURE — 84443 ASSAY THYROID STIM HORMONE: CPT

## 2018-01-08 PROCEDURE — 93010 ELECTROCARDIOGRAM REPORT: CPT | Mod: ,,, | Performed by: INTERNAL MEDICINE

## 2018-01-08 PROCEDURE — 80320 DRUG SCREEN QUANTALCOHOLS: CPT

## 2018-01-08 PROCEDURE — 80329 ANALGESICS NON-OPIOID 1 OR 2: CPT

## 2018-01-08 PROCEDURE — 80307 DRUG TEST PRSMV CHEM ANLYZR: CPT

## 2018-01-08 PROCEDURE — 80053 COMPREHEN METABOLIC PANEL: CPT

## 2018-01-08 PROCEDURE — 25000003 PHARM REV CODE 250: Performed by: PSYCHIATRY & NEUROLOGY

## 2018-01-08 PROCEDURE — 84439 ASSAY OF FREE THYROXINE: CPT

## 2018-01-08 PROCEDURE — 99285 EMERGENCY DEPT VISIT HI MDM: CPT

## 2018-01-08 RX ORDER — DIVALPROEX SODIUM 250 MG/1
250 TABLET, DELAYED RELEASE ORAL 3 TIMES DAILY
Status: DISCONTINUED | OUTPATIENT
Start: 2018-01-08 | End: 2018-01-08 | Stop reason: HOSPADM

## 2018-01-08 RX ORDER — QUETIAPINE FUMARATE 100 MG/1
100 TABLET, FILM COATED ORAL NIGHTLY
Status: DISCONTINUED | OUTPATIENT
Start: 2018-01-08 | End: 2018-01-08 | Stop reason: HOSPADM

## 2018-01-08 RX ORDER — SERTRALINE HYDROCHLORIDE 25 MG/1
25 TABLET, FILM COATED ORAL DAILY
Status: DISCONTINUED | OUTPATIENT
Start: 2018-01-08 | End: 2018-01-08 | Stop reason: HOSPADM

## 2018-01-08 RX ADMIN — SERTRALINE HYDROCHLORIDE 25 MG: 25 TABLET ORAL at 11:01

## 2018-01-08 NOTE — ED NOTES
PEC protocol continued. Sirisha Thompson remains at bedside for direct obs. Pt appears less lethargic, AAO x 4. Even unlabored respirations. Bed in lowest, locked position, side rails up x 2. Restroom needs addressed.

## 2018-01-08 NOTE — ED TRIAGE NOTES
"Pt states " I've been going through some stuff. I did heroine and cocaine. I've done this before and never felt like this. My insides are burning and my chest feels like its closing. I need some fluids and oxygen" pt does not appear to be in respiratory distress. Sat 100% RA. Pt answering questions appropriately. Pt denies purposefully taking too much. ,  "

## 2018-01-08 NOTE — ED NOTES
"Assumed care of pt from LEEANNE Polanco. PEC protocol continued. Sirisha Thompson at bedside for direct obs. Removed harmful objects from room. Pt remains lethargic but will arouse to verbal stimulation. Pt admits to "doing drugs". Pt calm and cooperative. VSS. Bed in lowest, locked position, side rails up x 2.   "

## 2018-01-08 NOTE — ED NOTES
Pt lying down,respirations even/unlabored. NAD noted. Pt denies any current needs. ED tech dandre at bedside for observation. Will continue to monitor.

## 2018-01-08 NOTE — ED NOTES
PEC protocol continued. Sirisha Thompson at bedside for direct obs. Pt sleeping in stretcher in NAD. Bed in lowest, locked position, side rails up x 2. PEC and packet faxed to [facility]

## 2018-01-08 NOTE — ED NOTES
Faxed PEC and packet to Xiomara with [facility] for possible psych placement. [phone]

## 2018-01-08 NOTE — ED NOTES
PEC protocol continued. Darrius Thompson at bedside for direct obs. Pt sleeping in stretcher in NAD. Bed in lowest, locked position, side rails up x 2.

## 2018-01-08 NOTE — ED NOTES
PEC protocol continued. Sirisha Thompson remains at bedside for direct obs. Pt sleeping in stretcher in NAD with even, unlabored respirations. Bed in lowest, locked position, side rails up x 2. Restroom needs addressed.

## 2018-01-08 NOTE — PROVIDER PROGRESS NOTES - EMERGENCY DEPT.
Encounter Date: 1/8/2018    ED Physician Progress Notes        Physician Note:   Signed out from Dr. Romero pending labs, patient with h/o bipolar d/o and drug abuse, initially presented after drug use with SI. Observed overnight, on re-assessment still with SI, so PEC placed by Dr. Romero.   Labs reviewed with no acute findings, patient is medically cleared for psychiatric evaluation and disposition.

## 2018-01-08 NOTE — ED NOTES
"Upon d/c pt states " I need help. I need to see my psychiatrist. I want to do it this way": MD notified   "
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
## 2018-01-08 NOTE — ED NOTES
PEC protocol continued. Sirisha Thompson at bedside for direct obs. Pt sleeping in stretcher in NAD. Bed in lowest, locked position, side rails up x 2. Pt to be transferred to [facility] care

## 2018-01-08 NOTE — ED NOTES
"Pt dressed into paper scrubs. Personal items put in bags and labeled. Pt items"  1 brown sweat pants  1 brown sweat shirt  1 pair brown boots  1 pair black socks  1 blue/plaid jacket  2 hats  1 harmonica   1 black cell phone  1 blue personal bag   "

## 2018-01-08 NOTE — ED NOTES
PEC protocol continued. Sirisha Thompson at bedside for direct obs. Pt sleeping in stretcher in NAD. Bed in lowest, locked position, side rails up x 2. Will call [facility] for report. [phone].

## 2018-01-09 NOTE — PSYCH
"IDENTIFICATION DATA:  This is a 27-year-old single -American male who was   brought to ER due to depression, suicidal ideation, and drug problem.  This   consult is requested by Dr. Mantilla for psych evaluation.  The patient is on a PEC   status.    CHIEF COMPLAINT:  "I relapsed on drugs, I need help."    HISTORY OF PRESENT ILLNESS:  This patient is known to me from previous   admissions due to depression, suicidal ideation and drug problem.  The patient   was diagnosed with bipolar and stopped taking his psych medications.  He is off   the medicine for a while.  The patient states that he relapsed on heroin,   cocaine, marijuana and Xanbar recently.  The patient states that his friend   dropped him here.  The patient states that he was doing bad stuff including   heroin.  He was injecting half gram of heroin everyday.  He has not been free of   drugs more than few months.  He gets withdrawals from opioids.  He also used   half a gram of cocaine and heroin.  He has been using drugs since his teens.  He   took 1 Xanbar yesterday.  He is also smoking marijuana, but not every day like   he was.  He smoked 1 to 2 joints.  The patient denies hearing voices or seeing   things.  The patient said that he is depressed, sad and anhedonic.  He is tired   of living and using drugs and wanted to end his life.  He is getting suicidal   thoughts.  The patient is positive for benzos, opioids, marijuana and cocaine.    The patient has no job.  He lives with his aunt.  He denies legal issues lately.    The patient has a history of self-inflicted behavior and was picking on skin   partly due to tactile hallucinations.  The patient was feeling hot yesterday and   felt like someone was injecting something.    PAST PSYCHIATRIC HISTORY:  The patient has been hospitalized at least 3 times in   the past, last time was at [facility] on 09/06/2017.  He was   detoxed and started on Depakote and Zoloft.  He was treated with " Abilify and   Seroquel in the past.  He had thoughts of committing suicide, but at one time   strangers stopped him when he was thinking about jumping off the bridge.  He has   been in detox, but he does not remember those places, but now he wanted to go   there.  He does not drink alcohol.   He had been in [facility] before.  He is not   going to any psychiatrist.    SOCIAL AND FAMILY HISTORY:  The patient was born and raised in [place].  He   described his childhood as not good.  He is single and has no children.  He has   no history of sexual abuse.  His mom and dad were alcoholic.  He lives with his   aunt.  He denies family history of mental illness, suicide or drug problem.    MEDICAL HISTORY:  Unremarkable.    ALLERGIES:  The patient is allergic to penicillin.    MEDICATIONS:  He was on Abilify, Depakote, and Seroquel in the past.  His   bilirubin 0.3, , .  His TSH is 1.7.  He was diagnosed with   hypothyroidism in the past.  His WBC is 5.6, hemoglobin 13.3, hematocrit 36.    MENTAL STATUS EXAMINATION:  This is a 27-year-old lean, tall -American   male who looks about his stated age.  He is slightly drowsy and oriented to day,   date, month and year.  Mood is depressed with sad affect.  Psychomotor activity   is decreased.  His speech is soft, clear, normal in amount, rate and tone.  No   loose association or racing thoughts noted.  He has no nausea, vomiting,   tremors, or sweating.  He is lacking some attention and concentration.  He was   intoxicated upon arrival.  He is able to recall 3 objects out of 3 immediately,   3 out of 3 after 5 minutes and events of the past.  He has no intention to harm   to self or others.  He denies hearing voices or seeing things today.  He still   has suicidal thoughts and wanted to go to the hospital.  Insight and judgment   are impaired.  He is of average intelligence person.  He has poor impulse   control.    PSYCHIATRIC DIAGNOSES:  AXIS I:  Bipolar  disorder, depressed without psychotic features, opioid use   disorder, cocaine use disorder, cannabis use disorder, benzo use disorder.  AXIS II:  History of antisocial personality disorder.  AXIS III:  No diagnosis.  AXIS IV:  Financial difficulties, housing problem, relapse on drugs,   noncompliant with psych medications.  AXIS V:  35.    RECOMMENDATIONS:  We will transfer this patient to [facility] for   stabilization.  We will initiate Depakote 250 mg 3 times a day, Zoloft 25 mg   p.o. q.a.m. and Seroquel 50 in the morning and 100 mg at nighttime.       Transfer the patient to [facility].  We will educate about medication   and illness.    ASSETS:  The patient is verbal, cognitively intact and has supportive aunt.    PROBLEM LIST FOR TREATMENT PLAN:  Depression, noncompliant with medicine, drug   problem.      HÉCTOR  dd: 01/08/2018 11:00:58 (CST)  td: 01/08/2018 20:07:32 (CST)  Doc ID   #[id number]  Job ID #[id number]    CC:

## 2018-01-14 ENCOUNTER — HOSPITAL ENCOUNTER (EMERGENCY)
Facility: OTHER | Age: 28
Discharge: HOME | End: 2018-01-15
Attending: EMERGENCY MEDICINE
Payer: MEDICAID

## 2018-01-14 DIAGNOSIS — F11.20 OPIOID USE DISORDER, SEVERE, DEPENDENCE: Chronic | ICD-10-CM

## 2018-01-14 DIAGNOSIS — F19.94 SUBSTANCE INDUCED MOOD DISORDER: ICD-10-CM

## 2018-01-14 DIAGNOSIS — F31.9 BIPOLAR AFFECTIVE DISORDER, REMISSION STATUS UNSPECIFIED: Primary | ICD-10-CM

## 2018-01-14 LAB
ALBUMIN SERPL BCP-MCNC: 4.1 G/DL
ALP SERPL-CCNC: 90 U/L
ALT SERPL W/O P-5'-P-CCNC: 270 U/L
AMPHET+METHAMPHET UR QL: NORMAL
ANION GAP SERPL CALC-SCNC: 8 MMOL/L
APAP SERPL-MCNC: <3 UG/ML
AST SERPL-CCNC: 129 U/L
BARBITURATES UR QL SCN>200 NG/ML: NEGATIVE
BASOPHILS # BLD AUTO: 0.02 K/UL
BASOPHILS NFR BLD: 0.2 %
BENZODIAZ UR QL SCN>200 NG/ML: NEGATIVE
BILIRUB SERPL-MCNC: 0.3 MG/DL
BILIRUB UR QL STRIP: NEGATIVE
BUN SERPL-MCNC: 8 MG/DL
BZE UR QL SCN: NORMAL
CALCIUM SERPL-MCNC: 10 MG/DL
CANNABINOIDS UR QL SCN: NORMAL
CHLORIDE SERPL-SCNC: 101 MMOL/L
CLARITY UR: CLEAR
CO2 SERPL-SCNC: 30 MMOL/L
COLOR UR: YELLOW
CREAT SERPL-MCNC: 1 MG/DL
CREAT UR-MCNC: 71.6 MG/DL
DIFFERENTIAL METHOD: ABNORMAL
EOSINOPHIL # BLD AUTO: 0.1 K/UL
EOSINOPHIL NFR BLD: 1.5 %
ERYTHROCYTE [DISTWIDTH] IN BLOOD BY AUTOMATED COUNT: 17.4 %
EST. GFR  (AFRICAN AMERICAN): >60 ML/MIN/1.73 M^2
EST. GFR  (NON AFRICAN AMERICAN): >60 ML/MIN/1.73 M^2
ETHANOL SERPL-MCNC: <10 MG/DL
GLUCOSE SERPL-MCNC: 102 MG/DL
GLUCOSE UR QL STRIP: NEGATIVE
HCT VFR BLD AUTO: 41 %
HGB BLD-MCNC: 12.7 G/DL
HGB UR QL STRIP: NEGATIVE
KETONES UR QL STRIP: NEGATIVE
LEUKOCYTE ESTERASE UR QL STRIP: NEGATIVE
LYMPHOCYTES # BLD AUTO: 2.3 K/UL
LYMPHOCYTES NFR BLD: 26.1 %
MCH RBC QN AUTO: 21.7 PG
MCHC RBC AUTO-ENTMCNC: 31 G/DL
MCV RBC AUTO: 70 FL
METHADONE UR QL SCN>300 NG/ML: NEGATIVE
MONOCYTES # BLD AUTO: 0.9 K/UL
MONOCYTES NFR BLD: 9.9 %
NEUTROPHILS # BLD AUTO: 5.4 K/UL
NEUTROPHILS NFR BLD: 62.1 %
NITRITE UR QL STRIP: NEGATIVE
OPIATES UR QL SCN: NORMAL
PCP UR QL SCN>25 NG/ML: NEGATIVE
PH UR STRIP: 7 [PH] (ref 5–8)
PLATELET # BLD AUTO: 319 K/UL
PMV BLD AUTO: 9.5 FL
POTASSIUM SERPL-SCNC: 3.6 MMOL/L
PROT SERPL-MCNC: 8.5 G/DL
PROT UR QL STRIP: NEGATIVE
RBC # BLD AUTO: 5.84 M/UL
SODIUM SERPL-SCNC: 139 MMOL/L
SP GR UR STRIP: <=1.005 (ref 1–1.03)
TOXICOLOGY INFORMATION: NORMAL
TSH SERPL DL<=0.005 MIU/L-ACNC: 0.74 UIU/ML
URN SPEC COLLECT METH UR: ABNORMAL
UROBILINOGEN UR STRIP-ACNC: NEGATIVE EU/DL
WBC # BLD AUTO: 8.62 K/UL

## 2018-01-14 PROCEDURE — 99285 EMERGENCY DEPT VISIT HI MDM: CPT | Mod: 25

## 2018-01-14 PROCEDURE — 63600175 PHARM REV CODE 636 W HCPCS: Performed by: EMERGENCY MEDICINE

## 2018-01-14 PROCEDURE — 25000003 PHARM REV CODE 250: Performed by: EMERGENCY MEDICINE

## 2018-01-14 PROCEDURE — 96374 THER/PROPH/DIAG INJ IV PUSH: CPT

## 2018-01-14 PROCEDURE — 93005 ELECTROCARDIOGRAM TRACING: CPT

## 2018-01-14 PROCEDURE — 84443 ASSAY THYROID STIM HORMONE: CPT

## 2018-01-14 PROCEDURE — 93010 ELECTROCARDIOGRAM REPORT: CPT | Mod: ,,, | Performed by: INTERNAL MEDICINE

## 2018-01-14 PROCEDURE — 85025 COMPLETE CBC W/AUTO DIFF WBC: CPT

## 2018-01-14 PROCEDURE — 80053 COMPREHEN METABOLIC PANEL: CPT

## 2018-01-14 PROCEDURE — 80320 DRUG SCREEN QUANTALCOHOLS: CPT

## 2018-01-14 PROCEDURE — 96361 HYDRATE IV INFUSION ADD-ON: CPT

## 2018-01-14 PROCEDURE — 81003 URINALYSIS AUTO W/O SCOPE: CPT

## 2018-01-14 PROCEDURE — 80329 ANALGESICS NON-OPIOID 1 OR 2: CPT

## 2018-01-14 PROCEDURE — 80307 DRUG TEST PRSMV CHEM ANLYZR: CPT

## 2018-01-14 PROCEDURE — 96375 TX/PRO/DX INJ NEW DRUG ADDON: CPT

## 2018-01-14 RX ORDER — QUETIAPINE FUMARATE 100 MG/1
100 TABLET, FILM COATED ORAL ONCE AS NEEDED
Status: ACTIVE | OUTPATIENT
Start: 2018-01-14 | End: 2018-01-14

## 2018-01-14 RX ORDER — QUETIAPINE FUMARATE 100 MG/1
300 TABLET, FILM COATED ORAL
Status: DISCONTINUED | OUTPATIENT
Start: 2018-01-14 | End: 2018-01-14

## 2018-01-14 RX ORDER — ONDANSETRON 2 MG/ML
4 INJECTION INTRAMUSCULAR; INTRAVENOUS
Status: COMPLETED | OUTPATIENT
Start: 2018-01-14 | End: 2018-01-14

## 2018-01-14 RX ADMIN — LORAZEPAM 1 MG: 2 INJECTION INTRAMUSCULAR; INTRAVENOUS at 07:01

## 2018-01-14 RX ADMIN — SODIUM CHLORIDE 1000 ML: 0.9 INJECTION, SOLUTION INTRAVENOUS at 07:01

## 2018-01-14 RX ADMIN — ONDANSETRON 4 MG: 2 INJECTION, SOLUTION INTRAMUSCULAR; INTRAVENOUS at 06:01
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
## 2018-01-14 NOTE — ED NOTES
Patient moved to ED room 4 via triage nurse, patient assisted onto stretcher and changed into a gown. Patient placed on cardiac monitor, continuous pulse oximetry and automatic blood pressure cuff. Bed placed in low locked position, side rails up x 2, call light is within reach of patient or family, orientation to room and explanation of wait provided to family and patient, alarms set and turned on for monitor and pulse ox, awaiting MD evaluation and orders, will continue to monitor.

## 2018-01-15 VITALS
HEART RATE: 82 BPM | BODY MASS INDEX: 25.8 KG/M2 | RESPIRATION RATE: 16 BRPM | OXYGEN SATURATION: 99 % | WEIGHT: 185 LBS | DIASTOLIC BLOOD PRESSURE: 70 MMHG | TEMPERATURE: 99 F | SYSTOLIC BLOOD PRESSURE: 120 MMHG

## 2018-01-15 NOTE — ED NOTES
Provider, [place], has made patient the a PEC.  ED sitter Neeta is at the bedside for direct patient observation.

## 2018-01-15 NOTE — ED NOTES
[facility] to [facility] and fill rx from previous visit. Pt refused ride to [facility], will go POV

## 2018-01-15 NOTE — ED NOTES
Pt resting in bed with eyes closed and covers pulled over his head, sitter Neeta remains at bedside keeping 15 min checks as per psych protocols

## 2018-01-15 NOTE — ED NOTES
Packet faxed to all adult psychiatric hospitals on [facility] placement list. Awaiting call back.

## 2018-01-15 NOTE — ED NOTES
Packet re-faxed to adult psychiatric hospitals on [facility] placement list. Awaiting call back. Will continue to seek placement.
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
## 2018-01-15 NOTE — ED NOTES
Patient has undressed and now has on PEC gown.  Patient belongings removed from room to be saved.

## 2018-01-15 NOTE — ED NOTES
Pt remains in paper scrubs. Elvis Thompson at bedside maintaining 1:1 direct visual contact and charting q15 minute patient check. Pt sleeping in bed with eyes closed. Respirations even and unlabored. Pt arouses to voice stimulus. Bed in low locked position. Side rails up x 2. Pt remains on continuous pulse oximeter and automated blood pressure cuff. Will continue to monitor.     Dr. Shepherd on unit.

## 2018-01-15 NOTE — ED NOTES
Two patient identifiers have been checked and are correct.      Appearance: Pt awake, alert & oriented to person, place & time. Patient apears anxious. Pt is clean and well groomed with clothes appropriately fastened.    Skin: Skin warm, dry & intact. Color consistent with ethnicity. Mucous membranes moist. No breakdown or brusing noted.   Musculoskeletal: Patient moving all extremities well, no obvious swelling or deformities noted.   Respiratory: Respirations spontaneous, even, and non-labored. Visible chest rise noted. Airway is open and patent. No accessory muscle use noted.   Neurologic: Sensation is intact. Speech is clear and appropriate. Eyes open spontaneously, behavior appropriate to situation, follows commands, facial expression symmetrical, bilateral hand grasp equal and even, purposeful motor response noted.  Cardiac: All peripheral pulses present. No Bilateral lower extremity edema. Cap refill is <3 seconds.  Abdomen: Abdomen soft, non-tender to palpation.   : Pt reports no dysuria or hematuria.

## 2018-01-15 NOTE — ED NOTES
Pt remains in paper scrubs. Elvis Thompson, at bedside maintaining 1:1 direct visual contact and charting q15 minute patient check. Pt sleeping in bed with eyes closed. Respirations even and unlabored. Pt arouses to voice stimulus. Bed in low locked position. Side rails up x 2. Pt remains on continuous pulse oximeter and automated blood pressure cuff. Will continue to monitor.

## 2018-01-15 NOTE — ED NOTES
Pt was hiding his phone under a blanket. The phone is taken away and will be locket up in security

## 2018-01-15 NOTE — ED NOTES
Pt lying in bed with eyes open watching television. Pt remains calm and cooperative, sitter Neeta remains at bedside keeping 15 min checks as per psych protocols.

## 2018-01-15 NOTE — ED NOTES
Donna Santacruz remains at bedside, Pt resting in bed with eyes closed, NAD noted, pt remains calm and cooperative

## 2018-01-15 NOTE — ED NOTES
Pt extremely drowsy now and unable to keep his eyes open or head up, after yelling at football game less than 20 min ago.

## 2018-01-15 NOTE — PROVIDER PROGRESS NOTES - EMERGENCY DEPT.
Encounter Date: 1/14/2018    ED Physician Progress Notes        Physician Note:   Pt is a 28 y/o M who presents to ED with auditory hallucinations after drugs. Pt appears well, nontoxic. Pt was PEC by previous ED physician.  Pt seen and evaluated by Dr. Shepherd, recently discharged from his psychiatric facility and is already set up for a rehab facility on Monday.  Per Dr. Shepherd, pt can be discharged to go to his rehab facility.
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
## 2018-02-01 ENCOUNTER — HOSPITAL ENCOUNTER (EMERGENCY)
Facility: OTHER | Age: 28
Discharge: PSYCHIATRIC OR SUBSTANCE USE FACILITY | End: 2018-02-02
Attending: EMERGENCY MEDICINE
Payer: MEDICAID

## 2018-02-01 DIAGNOSIS — F19.10 POLYSUBSTANCE ABUSE: ICD-10-CM

## 2018-02-01 DIAGNOSIS — Z91.148 NONCOMPLIANCE WITH MEDICATIONS: ICD-10-CM

## 2018-02-01 DIAGNOSIS — R44.0 AUDITORY HALLUCINATION: ICD-10-CM

## 2018-02-01 DIAGNOSIS — R41.82 ALTERED MENTAL STATUS: ICD-10-CM

## 2018-02-01 DIAGNOSIS — F41.9 ANXIETY: ICD-10-CM

## 2018-02-01 DIAGNOSIS — G47.00 INSOMNIA, UNSPECIFIED TYPE: Primary | ICD-10-CM

## 2018-02-01 PROCEDURE — 25000003 PHARM REV CODE 250: Performed by: EMERGENCY MEDICINE

## 2018-02-01 PROCEDURE — 99285 EMERGENCY DEPT VISIT HI MDM: CPT | Mod: 25

## 2018-02-01 PROCEDURE — 93005 ELECTROCARDIOGRAM TRACING: CPT

## 2018-02-01 RX ORDER — QUETIAPINE FUMARATE 100 MG/1
300 TABLET, FILM COATED ORAL
Status: COMPLETED | OUTPATIENT
Start: 2018-02-01 | End: 2018-02-01

## 2018-02-01 RX ORDER — CITALOPRAM 20 MG/1
40 TABLET, FILM COATED ORAL DAILY
Status: DISCONTINUED | OUTPATIENT
Start: 2018-02-01 | End: 2018-02-02 | Stop reason: HOSPADM

## 2018-02-01 RX ORDER — CITALOPRAM 20 MG/1
40 TABLET, FILM COATED ORAL DAILY
Status: DISCONTINUED | OUTPATIENT
Start: 2018-02-02 | End: 2018-02-01

## 2018-02-01 RX ORDER — DIAZEPAM 2 MG/1
2 TABLET ORAL
Status: COMPLETED | OUTPATIENT
Start: 2018-02-01 | End: 2018-02-01

## 2018-02-01 RX ADMIN — QUETIAPINE FUMARATE 300 MG: 100 TABLET, FILM COATED ORAL at 11:02

## 2018-02-01 RX ADMIN — CITALOPRAM HYDROBROMIDE 40 MG: 20 TABLET ORAL at 11:02

## 2018-02-01 RX ADMIN — DIAZEPAM 2 MG: 2 TABLET ORAL at 11:02

## 2018-02-02 VITALS
OXYGEN SATURATION: 97 % | HEART RATE: 62 BPM | RESPIRATION RATE: 16 BRPM | HEIGHT: 71 IN | WEIGHT: 175 LBS | BODY MASS INDEX: 24.5 KG/M2 | SYSTOLIC BLOOD PRESSURE: 120 MMHG | DIASTOLIC BLOOD PRESSURE: 81 MMHG | TEMPERATURE: 98 F

## 2018-02-02 LAB
ALBUMIN SERPL BCP-MCNC: 3.3 G/DL
ALP SERPL-CCNC: 91 U/L
ALT SERPL W/O P-5'-P-CCNC: 161 U/L
AMPHET+METHAMPHET UR QL: NEGATIVE
ANION GAP SERPL CALC-SCNC: 9 MMOL/L
ANISOCYTOSIS BLD QL SMEAR: SLIGHT
APAP SERPL-MCNC: <3 UG/ML
AST SERPL-CCNC: 118 U/L
BARBITURATES UR QL SCN>200 NG/ML: NEGATIVE
BASOPHILS # BLD AUTO: 0.01 K/UL
BASOPHILS NFR BLD: 0.2 %
BENZODIAZ UR QL SCN>200 NG/ML: NEGATIVE
BILIRUB SERPL-MCNC: 0.2 MG/DL
BILIRUB UR QL STRIP: NEGATIVE
BUN SERPL-MCNC: 10 MG/DL
BZE UR QL SCN: NORMAL
CALCIUM SERPL-MCNC: 8.9 MG/DL
CANNABINOIDS UR QL SCN: NORMAL
CHLORIDE SERPL-SCNC: 105 MMOL/L
CLARITY UR: CLEAR
CO2 SERPL-SCNC: 24 MMOL/L
COLOR UR: YELLOW
CREAT SERPL-MCNC: 0.9 MG/DL
CREAT UR-MCNC: 163.1 MG/DL
DACRYOCYTES BLD QL SMEAR: ABNORMAL
DIFFERENTIAL METHOD: ABNORMAL
EOSINOPHIL # BLD AUTO: 0.2 K/UL
EOSINOPHIL NFR BLD: 3.8 %
ERYTHROCYTE [DISTWIDTH] IN BLOOD BY AUTOMATED COUNT: 17 %
EST. GFR  (AFRICAN AMERICAN): >60 ML/MIN/1.73 M^2
EST. GFR  (NON AFRICAN AMERICAN): >60 ML/MIN/1.73 M^2
ETHANOL SERPL-MCNC: <10 MG/DL
GIANT PLATELETS BLD QL SMEAR: PRESENT
GLUCOSE SERPL-MCNC: 82 MG/DL
GLUCOSE UR QL STRIP: NEGATIVE
HAV IGM SERPL QL IA: NEGATIVE
HBV CORE IGM SERPL QL IA: NEGATIVE
HBV SURFACE AG SERPL QL IA: NEGATIVE
HCT VFR BLD AUTO: 37.4 %
HCV AB SERPL QL IA: POSITIVE
HGB BLD-MCNC: 11.4 G/DL
HGB UR QL STRIP: NEGATIVE
KETONES UR QL STRIP: NEGATIVE
LEUKOCYTE ESTERASE UR QL STRIP: NEGATIVE
LYMPHOCYTES # BLD AUTO: 1.4 K/UL
LYMPHOCYTES NFR BLD: 33.1 %
MCH RBC QN AUTO: 21.8 PG
MCHC RBC AUTO-ENTMCNC: 30.5 G/DL
MCV RBC AUTO: 72 FL
METHADONE UR QL SCN>300 NG/ML: NEGATIVE
MONOCYTES # BLD AUTO: 0.4 K/UL
MONOCYTES NFR BLD: 8.7 %
NEUTROPHILS # BLD AUTO: 2.3 K/UL
NEUTROPHILS NFR BLD: 54.2 %
NITRITE UR QL STRIP: NEGATIVE
OPIATES UR QL SCN: NORMAL
OVALOCYTES BLD QL SMEAR: ABNORMAL
PCP UR QL SCN>25 NG/ML: NEGATIVE
PH UR STRIP: 6 [PH] (ref 5–8)
PLATELET # BLD AUTO: 246 K/UL
PLATELET BLD QL SMEAR: ABNORMAL
PMV BLD AUTO: 9.4 FL
POIKILOCYTOSIS BLD QL SMEAR: SLIGHT
POTASSIUM SERPL-SCNC: 4 MMOL/L
PROT SERPL-MCNC: 7.3 G/DL
PROT UR QL STRIP: NEGATIVE
RBC # BLD AUTO: 5.22 M/UL
SCHISTOCYTES BLD QL SMEAR: PRESENT
SODIUM SERPL-SCNC: 138 MMOL/L
SP GR UR STRIP: 1.02 (ref 1–1.03)
T4 FREE SERPL-MCNC: 0.88 NG/DL
TOXICOLOGY INFORMATION: NORMAL
TSH SERPL DL<=0.005 MIU/L-ACNC: 0.2 UIU/ML
URN SPEC COLLECT METH UR: NORMAL
UROBILINOGEN UR STRIP-ACNC: NEGATIVE EU/DL
WBC # BLD AUTO: 4.26 K/UL

## 2018-02-02 PROCEDURE — 25000003 PHARM REV CODE 250: Performed by: EMERGENCY MEDICINE

## 2018-02-02 PROCEDURE — 84439 ASSAY OF FREE THYROXINE: CPT

## 2018-02-02 PROCEDURE — 85025 COMPLETE CBC W/AUTO DIFF WBC: CPT

## 2018-02-02 PROCEDURE — 80329 ANALGESICS NON-OPIOID 1 OR 2: CPT

## 2018-02-02 PROCEDURE — 80307 DRUG TEST PRSMV CHEM ANLYZR: CPT

## 2018-02-02 PROCEDURE — 93010 ELECTROCARDIOGRAM REPORT: CPT | Mod: ,,, | Performed by: INTERNAL MEDICINE

## 2018-02-02 PROCEDURE — 81003 URINALYSIS AUTO W/O SCOPE: CPT

## 2018-02-02 PROCEDURE — 80074 ACUTE HEPATITIS PANEL: CPT

## 2018-02-02 PROCEDURE — 84443 ASSAY THYROID STIM HORMONE: CPT

## 2018-02-02 PROCEDURE — 25000003 PHARM REV CODE 250: Performed by: PSYCHIATRY & NEUROLOGY

## 2018-02-02 PROCEDURE — 36415 COLL VENOUS BLD VENIPUNCTURE: CPT

## 2018-02-02 PROCEDURE — 80053 COMPREHEN METABOLIC PANEL: CPT

## 2018-02-02 PROCEDURE — 80320 DRUG SCREEN QUANTALCOHOLS: CPT

## 2018-02-02 RX ORDER — QUETIAPINE FUMARATE 100 MG/1
300 TABLET, FILM COATED ORAL NIGHTLY
Status: DISCONTINUED | OUTPATIENT
Start: 2018-02-02 | End: 2018-02-02 | Stop reason: HOSPADM

## 2018-02-02 RX ORDER — VENLAFAXINE HYDROCHLORIDE 37.5 MG/1
37.5 CAPSULE, EXTENDED RELEASE ORAL 2 TIMES DAILY
Status: DISCONTINUED | OUTPATIENT
Start: 2018-02-02 | End: 2018-02-02 | Stop reason: HOSPADM

## 2018-02-02 RX ADMIN — CITALOPRAM HYDROBROMIDE 40 MG: 20 TABLET ORAL at 09:02

## 2018-02-02 RX ADMIN — VENLAFAXINE HYDROCHLORIDE 37.5 MG: 37.5 CAPSULE, EXTENDED RELEASE ORAL at 11:02

## 2018-02-02 NOTE — ED NOTES
Pt resting in bed with eyes closed, semi-Amado's, visible chest rise and fall with equal bilateral chest expansion.

## 2018-02-02 NOTE — ED NOTES
Pt resting in bed with eyes closed, semi Amado's, visible chest rise and fall with equal chest expansion. No changes.

## 2018-02-02 NOTE — ED NOTES
Pt resting in bed, NAD, respirations even and unlabored. Alvertoa at bedside doing Q15 min assesments. Will continue to monitor.

## 2018-02-02 NOTE — ED NOTES
Patient belongings:  Brown boots  1 pair of black socks  Brown wooden necklace  Black fleece jacket  Grey T-shirt  Grey jeans with holes  1 black cell phone     Belongings locked up with security

## 2018-02-02 NOTE — ED NOTES
Pt resting in bed with eyes closed, semi-Amado's, visible chest rise and fall with equal chest expansion. No changes.

## 2018-02-02 NOTE — ED NOTES
"Pt awakened and told that he was ready for discharge. Pt asks "y'all did not PEC me" when told pt no, he keeps stating "Y'all need to PEC me" when asked why, pt unable to elaborate. MD aware  "

## 2018-02-02 NOTE — ED NOTES
Rounding on pt complete. Pt with ED sitter at bedside, Ledy, remains in direct visual contact of pt.  Visible chest rise and fall. Respirations even and unlabored. PEC precautions maintained. Will cont to monitor.
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
## 2018-02-02 NOTE — ED NOTES
Pt accepted at [facility], packet faxed to [facility]. Report to be called to option 2

## 2018-02-02 NOTE — ED NOTES
"Pt AAOx3, sitting in bed quietly, cooperative, states "I haven't taken my medications in 2 days, and I'm feeling anxious". On cardiac monitor, continuous pulse ox, BP cycling q 30 mins. Medications administered.  "

## 2018-02-02 NOTE — ED NOTES
Hourly rounding complete. Pt resting with eyes closed, visible even chest rise and fall noted. PEC precautions maintained. Hailey Thompson, at bedside with direct visual contact of pt. Will cont to monitor.

## 2018-02-02 NOTE — ED NOTES
Sitter placed at bedside, Zeinab, nurses also at bedside to obtain labs and EKG. Pt is in paper scrubs and all belongings sent to security

## 2018-02-02 NOTE — ED NOTES
Pt resting in bed with eyes closed, semi-Amado's, visible chest rise and fall with equal bilateral chest expansion. WCTM

## 2018-02-02 NOTE — ED NOTES
Pt resting in bed with eyes closed, semi Amado's, visible chest rise and fall with equal chest expansion.

## 2018-02-02 NOTE — ED NOTES
Pt states that he has not taken his psych medication for 2 days and is hearing voices. Pt has been taking XTC for the last few days and has been feeling anxious. Pt has also not been able to sleep for 2 days

## 2018-03-23 ENCOUNTER — HOSPITAL ENCOUNTER (EMERGENCY)
Facility: OTHER | Age: 28
Discharge: HOME | End: 2018-03-23
Attending: EMERGENCY MEDICINE
Payer: MEDICAID

## 2018-03-23 VITALS
HEIGHT: 69 IN | DIASTOLIC BLOOD PRESSURE: 61 MMHG | SYSTOLIC BLOOD PRESSURE: 134 MMHG | BODY MASS INDEX: 25.18 KG/M2 | RESPIRATION RATE: 13 BRPM | HEART RATE: 64 BPM | WEIGHT: 170 LBS | TEMPERATURE: 98 F | OXYGEN SATURATION: 95 %

## 2018-03-23 DIAGNOSIS — R07.9 CHEST PAIN: ICD-10-CM

## 2018-03-23 DIAGNOSIS — R11.2 NAUSEA AND VOMITING IN ADULT: Primary | ICD-10-CM

## 2018-03-23 DIAGNOSIS — R06.00 DYSPNEA: ICD-10-CM

## 2018-03-23 PROCEDURE — 25000003 PHARM REV CODE 250: Performed by: EMERGENCY MEDICINE

## 2018-03-23 PROCEDURE — 99284 EMERGENCY DEPT VISIT MOD MDM: CPT | Mod: 25

## 2018-03-23 PROCEDURE — 93005 ELECTROCARDIOGRAM TRACING: CPT

## 2018-03-23 PROCEDURE — 25000242 PHARM REV CODE 250 ALT 637 W/ HCPCS: Performed by: EMERGENCY MEDICINE

## 2018-03-23 PROCEDURE — 93010 ELECTROCARDIOGRAM REPORT: CPT | Mod: ,,, | Performed by: INTERNAL MEDICINE

## 2018-03-23 PROCEDURE — 94640 AIRWAY INHALATION TREATMENT: CPT

## 2018-03-23 RX ORDER — ONDANSETRON 8 MG/1
8 TABLET, ORALLY DISINTEGRATING ORAL EVERY 6 HOURS PRN
Qty: 15 TABLET | Refills: 0 | Status: SHIPPED | OUTPATIENT
Start: 2018-03-23

## 2018-03-23 RX ORDER — ONDANSETRON 8 MG/1
8 TABLET, ORALLY DISINTEGRATING ORAL
Status: COMPLETED | OUTPATIENT
Start: 2018-03-23 | End: 2018-03-23

## 2018-03-23 RX ORDER — ALBUTEROL SULFATE 0.83 MG/ML
2.5 SOLUTION RESPIRATORY (INHALATION)
Status: COMPLETED | OUTPATIENT
Start: 2018-03-23 | End: 2018-03-23

## 2018-03-23 RX ADMIN — ALBUTEROL SULFATE 2.5 MG: 2.5 SOLUTION RESPIRATORY (INHALATION) at 02:03

## 2018-03-23 RX ADMIN — ONDANSETRON 8 MG: 8 TABLET, ORALLY DISINTEGRATING ORAL at 02:03
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
## 2018-03-23 NOTE — ED NOTES
Pt used heroin right before he vomited and had stomach cramping that went up into his chest on the rt side. Pt states that he vomited after eating [employer].  LOC: Pt is awake alert and aware of environment, oriented X3 and speaking appropriately  Appearance: Pt is in no acute distress, Pt is well groomed and clean  Skin: skin is warm and dry with normal turgor, mucus membranes are moist and pink, pt has scabs and sores scattered all over in different stages of healling  Muskuloskeletal: Pt moves all extremities well, there is no obvious swelling or deformities noted, pulses are intact.  Respiratory: Airway is open and patent, respirations are spontaneous and even.  Cardiac:  no edema and cap refill is <3sec  Abdomen: soft, non-tender and non-distended  Neuro: Pt follows commands easily and has no obvious deficits

## 2018-11-29 NOTE — ED NOTES
Pt resting in bed with eyes closed, NAD noted, will continue to monitor   Refill request is for a maintenance medication and has met the criteria specified in the Ambulatory Medication Refill Standing Order for eligibility, visits, laboratory, alerts and was sent to the requested pharmacy.
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
